# Patient Record
Sex: MALE | Race: BLACK OR AFRICAN AMERICAN | NOT HISPANIC OR LATINO | URBAN - METROPOLITAN AREA
[De-identification: names, ages, dates, MRNs, and addresses within clinical notes are randomized per-mention and may not be internally consistent; named-entity substitution may affect disease eponyms.]

---

## 2019-06-07 ENCOUNTER — EMERGENCY (EMERGENCY)
Facility: HOSPITAL | Age: 44
LOS: 1 days | Discharge: HOME | End: 2019-06-07
Attending: EMERGENCY MEDICINE | Admitting: EMERGENCY MEDICINE
Payer: SELF-PAY

## 2019-06-07 VITALS
SYSTOLIC BLOOD PRESSURE: 140 MMHG | DIASTOLIC BLOOD PRESSURE: 79 MMHG | RESPIRATION RATE: 19 BRPM | OXYGEN SATURATION: 96 % | HEART RATE: 78 BPM

## 2019-06-07 VITALS
HEART RATE: 66 BPM | DIASTOLIC BLOOD PRESSURE: 76 MMHG | RESPIRATION RATE: 36 BRPM | OXYGEN SATURATION: 100 % | SYSTOLIC BLOOD PRESSURE: 134 MMHG

## 2019-06-07 LAB
ALBUMIN SERPL ELPH-MCNC: 4 G/DL — SIGNIFICANT CHANGE UP (ref 3.3–5)
ALP SERPL-CCNC: 53 U/L — SIGNIFICANT CHANGE UP (ref 40–120)
ALT FLD-CCNC: 16 U/L — SIGNIFICANT CHANGE UP (ref 4–41)
ANION GAP SERPL CALC-SCNC: 14 MMO/L — SIGNIFICANT CHANGE UP (ref 7–14)
APTT BLD: 28.8 SEC — SIGNIFICANT CHANGE UP (ref 27.5–36.3)
AST SERPL-CCNC: 31 U/L — SIGNIFICANT CHANGE UP (ref 4–40)
BASOPHILS # BLD AUTO: 0.03 K/UL — SIGNIFICANT CHANGE UP (ref 0–0.2)
BASOPHILS NFR BLD AUTO: 0.2 % — SIGNIFICANT CHANGE UP (ref 0–2)
BILIRUB SERPL-MCNC: 0.6 MG/DL — SIGNIFICANT CHANGE UP (ref 0.2–1.2)
BLD GP AB SCN SERPL QL: NEGATIVE — SIGNIFICANT CHANGE UP
BUN SERPL-MCNC: 7 MG/DL — SIGNIFICANT CHANGE UP (ref 7–23)
CALCIUM SERPL-MCNC: 8.7 MG/DL — SIGNIFICANT CHANGE UP (ref 8.4–10.5)
CHLORIDE SERPL-SCNC: 102 MMOL/L — SIGNIFICANT CHANGE UP (ref 98–107)
CO2 SERPL-SCNC: 25 MMOL/L — SIGNIFICANT CHANGE UP (ref 22–31)
CREAT SERPL-MCNC: 1.07 MG/DL — SIGNIFICANT CHANGE UP (ref 0.5–1.3)
EOSINOPHIL # BLD AUTO: 0.17 K/UL — SIGNIFICANT CHANGE UP (ref 0–0.5)
EOSINOPHIL NFR BLD AUTO: 1 % — SIGNIFICANT CHANGE UP (ref 0–6)
GLUCOSE SERPL-MCNC: 154 MG/DL — HIGH (ref 70–99)
HCT VFR BLD CALC: 43.6 % — SIGNIFICANT CHANGE UP (ref 39–50)
HGB BLD-MCNC: 13.2 G/DL — SIGNIFICANT CHANGE UP (ref 13–17)
IMM GRANULOCYTES NFR BLD AUTO: 0.5 % — SIGNIFICANT CHANGE UP (ref 0–1.5)
INR BLD: 1.02 — SIGNIFICANT CHANGE UP (ref 0.88–1.17)
LYMPHOCYTES # BLD AUTO: 12.8 % — LOW (ref 13–44)
LYMPHOCYTES # BLD AUTO: 2.21 K/UL — SIGNIFICANT CHANGE UP (ref 1–3.3)
MCHC RBC-ENTMCNC: 29.1 PG — SIGNIFICANT CHANGE UP (ref 27–34)
MCHC RBC-ENTMCNC: 30.3 % — LOW (ref 32–36)
MCV RBC AUTO: 96.2 FL — SIGNIFICANT CHANGE UP (ref 80–100)
MONOCYTES # BLD AUTO: 0.98 K/UL — HIGH (ref 0–0.9)
MONOCYTES NFR BLD AUTO: 5.7 % — SIGNIFICANT CHANGE UP (ref 2–14)
NEUTROPHILS # BLD AUTO: 13.85 K/UL — HIGH (ref 1.8–7.4)
NEUTROPHILS NFR BLD AUTO: 79.8 % — HIGH (ref 43–77)
NRBC # FLD: 0 K/UL — SIGNIFICANT CHANGE UP (ref 0–0)
PLATELET # BLD AUTO: 357 K/UL — SIGNIFICANT CHANGE UP (ref 150–400)
PMV BLD: 11.1 FL — SIGNIFICANT CHANGE UP (ref 7–13)
POTASSIUM SERPL-MCNC: 3.7 MMOL/L — SIGNIFICANT CHANGE UP (ref 3.5–5.3)
POTASSIUM SERPL-SCNC: 3.7 MMOL/L — SIGNIFICANT CHANGE UP (ref 3.5–5.3)
PROT SERPL-MCNC: 6.8 G/DL — SIGNIFICANT CHANGE UP (ref 6–8.3)
PROTHROM AB SERPL-ACNC: 11.6 SEC — SIGNIFICANT CHANGE UP (ref 9.8–13.1)
RBC # BLD: 4.53 M/UL — SIGNIFICANT CHANGE UP (ref 4.2–5.8)
RBC # FLD: 12.7 % — SIGNIFICANT CHANGE UP (ref 10.3–14.5)
RH IG SCN BLD-IMP: POSITIVE — SIGNIFICANT CHANGE UP
SODIUM SERPL-SCNC: 141 MMOL/L — SIGNIFICANT CHANGE UP (ref 135–145)
WBC # BLD: 17.33 K/UL — HIGH (ref 3.8–10.5)
WBC # FLD AUTO: 17.33 K/UL — HIGH (ref 3.8–10.5)

## 2019-06-07 PROCEDURE — 31500 INSERT EMERGENCY AIRWAY: CPT

## 2019-06-07 PROCEDURE — 99291 CRITICAL CARE FIRST HOUR: CPT | Mod: 25

## 2019-06-07 PROCEDURE — 71045 X-RAY EXAM CHEST 1 VIEW: CPT | Mod: 26

## 2019-06-07 RX ORDER — SUCCINYLCHOLINE CHLORIDE 100 MG/5ML
150 SYRINGE (ML) INTRAVENOUS ONCE
Refills: 0 | Status: COMPLETED | OUTPATIENT
Start: 2019-06-07 | End: 2019-06-07

## 2019-06-07 RX ORDER — IBUPROFEN 200 MG
1 TABLET ORAL
Qty: 28 | Refills: 0
Start: 2019-06-07 | End: 2019-06-13

## 2019-06-07 RX ORDER — FENTANYL CITRATE 50 UG/ML
1 INJECTION INTRAVENOUS
Qty: 2500 | Refills: 0 | Status: DISCONTINUED | OUTPATIENT
Start: 2019-06-07 | End: 2019-06-07

## 2019-06-07 RX ORDER — MIDAZOLAM HYDROCHLORIDE 1 MG/ML
4 INJECTION, SOLUTION INTRAMUSCULAR; INTRAVENOUS ONCE
Refills: 0 | Status: DISCONTINUED | OUTPATIENT
Start: 2019-06-07 | End: 2019-06-07

## 2019-06-07 RX ORDER — MIDAZOLAM HYDROCHLORIDE 1 MG/ML
0.02 INJECTION, SOLUTION INTRAMUSCULAR; INTRAVENOUS
Qty: 100 | Refills: 0 | Status: DISCONTINUED | OUTPATIENT
Start: 2019-06-07 | End: 2019-06-07

## 2019-06-07 RX ORDER — ONDANSETRON 8 MG/1
1 TABLET, FILM COATED ORAL
Qty: 15 | Refills: 0
Start: 2019-06-07 | End: 2019-06-11

## 2019-06-07 RX ORDER — PROPOFOL 10 MG/ML
60 INJECTION, EMULSION INTRAVENOUS
Qty: 1000 | Refills: 0 | Status: DISCONTINUED | OUTPATIENT
Start: 2019-06-07 | End: 2019-06-07

## 2019-06-07 RX ORDER — ETOMIDATE 2 MG/ML
20 INJECTION INTRAVENOUS ONCE
Refills: 0 | Status: COMPLETED | OUTPATIENT
Start: 2019-06-07 | End: 2019-06-07

## 2019-06-07 RX ADMIN — FENTANYL CITRATE 9 MICROGRAM(S)/KG/HR: 50 INJECTION INTRAVENOUS at 15:09

## 2019-06-07 RX ADMIN — Medication 150 MILLIGRAM(S): at 14:41

## 2019-06-07 RX ADMIN — MIDAZOLAM HYDROCHLORIDE 4 MILLIGRAM(S): 1 INJECTION, SOLUTION INTRAMUSCULAR; INTRAVENOUS at 15:06

## 2019-06-07 RX ADMIN — PROPOFOL 32.4 MICROGRAM(S)/KG/MIN: 10 INJECTION, EMULSION INTRAVENOUS at 15:20

## 2019-06-07 RX ADMIN — ETOMIDATE 20 MILLIGRAM(S): 2 INJECTION INTRAVENOUS at 14:40

## 2019-06-07 NOTE — ED ADULT NURSE REASSESSMENT NOTE - NS ED NURSE REASSESS COMMENT FT1
report received. pt with snoring resp. awakens to verbal stimuli,falls back to sleep,opens eyes intermittently.

## 2019-06-07 NOTE — ED PROVIDER NOTE - NSFOLLOWUPINSTRUCTIONS_ED_ALL_ED_FT
1) Follow up with your doctor in 1 week. Call for an appointment.     2) Return to the ER immediately for new or worsening symptoms including fevers, vomiting or recurrent bleeding.     3) Take percocet once every 6-8 hours as needed for severe pain.     4) Take zofran under you tongue for nausea as needed up to three times a day.     5) Take the antibiotic once a day for 1 week.     6) Take ibuprofen 800mg every 6-8 hours as needed for mild to moderate pain.

## 2019-06-07 NOTE — CONSULT NOTE ADULT - ATTENDING COMMENTS
I was scrubbed for the procedure and participated directly in all surgical management of this patient.

## 2019-06-07 NOTE — ED ADULT NURSE NOTE - NSIMPLEMENTINTERV_GEN_ALL_ED
Implemented All Fall Risk Interventions:  [place] to call system. Call bell, personal items and telephone within reach. Instruct patient to call for assistance. Room bathroom lighting operational. Non-slip footwear when patient is off stretcher. Physically safe environment: no spills, clutter or unnecessary equipment. Stretcher in lowest position, wheels locked, appropriate side rails in place. Provide visual cue, wrist band, yellow gown, etc. Monitor gait and stability. Monitor for mental status changes and reorient to person, place, and time. Review medications for side effects contributing to fall risk. Reinforce activity limits and safety measures with patient and family.

## 2019-06-07 NOTE — ED ADULT NURSE NOTE - OBJECTIVE STATEMENT
received to TrB as notification from doctors office having procedure done, pt arrives with LMS placed my dental surgeons that accompanied pt, pt sedated prior to arrival, with active oral bleed, pt intubated on arrival with 7.5 tube @ 23 line, propofol and fentanyl drips initiated as ordered. received to TrB as notification active oral bleeding, pt sedated prior to arrival and arrives with LMA placed by dental surgeons that accompanied pt from office, pt intubated on arrival with 7.5 tube @ 23 line, propofol and fentanyl drips initiated as ordered, OMFS team remains at the bedside, family updated with POC by surgeon. received to TrB as notification active oral bleeding, pt sedated prior to arrival and arrives with LMA placed by dental surgeons that accompanied pt from office, pt intubated on arrival with 7.5 tube @ 23 line, 2 IV access lft arm obtained, one 18g, on 20g, labs obtained, propofol and fentanyl drips initiated as ordered, pt has remained VSS, OMFS team remains at the bedside, family updated with POC by surgeon.

## 2019-06-07 NOTE — ED ADULT TRIAGE NOTE - CHIEF COMPLAINT QUOTE
Notification direct to TrB.  Pt having dental procedure and pt bleeding.  Unable to stop bleeding.  Arrived with LMA in place.  Arrives with PIV in place.  Propofol given by dentist as per EMS.

## 2019-06-07 NOTE — ED PROVIDER NOTE - OBJECTIVE STATEMENT
43M no sig PMH p/w significant oral bleeding s/p OMFS procedure. Pt was undergoing procedure complicated by arterial bleed. LMA was placed and pt transferred to [facility] with OMFS surgeons on ambulance. Pt received propofol enroute to [facility]. Upon arrival pt with 4 igel LMA, bleeding noted from mouth sat 100%

## 2019-06-07 NOTE — ED ADULT NURSE REASSESSMENT NOTE - NS ED NURSE REASSESS COMMENT FT1
pt sleeping,snoring noted,re eval by oral surgery. bld noted on gauze in mouth,md informed. pt responds by moaning to verbal,tactile stimuli. family at bedside

## 2019-06-07 NOTE — ED ADULT NURSE REASSESSMENT NOTE - NS ED NURSE REASSESS COMMENT FT1
Pt more awake, wants to attempt to ambulate. Pt ambulated around room with RN and MD Monson at bedside. Pt vomited x2. Pt to remain for more monitoring/observation. Will continue to monitor.

## 2019-06-07 NOTE — ED ADULT NURSE REASSESSMENT NOTE - NS ED NURSE REASSESS COMMENT FT1
Pt awake, alert, sitting up in bed, respirations even and unlabored b/l. Pt no longer vomiting at this time, reports only spitting. Water given by MD Monson to PO trial. Family at bedside. Will continue to monitor.

## 2019-06-07 NOTE — ED ADULT NURSE REASSESSMENT NOTE - NS ED NURSE REASSESS COMMENT FT1
pt extubated, all drips discontinued 1800, pt with snoring respirations at this time, NRB switched to 4L NC, O2 sat 100%, VSS, OMFS at the bedside placed knotted gauze at suture site, will continue to monitor

## 2019-06-07 NOTE — ED PROVIDER NOTE - CLINICAL SUMMARY MEDICAL DECISION MAKING FREE TEXT BOX
43M p/w oral bleeding after OMFS procedure; intubated for airway protection; OMFS at bedside to assess/acheieve hemostasis

## 2019-06-07 NOTE — CONSULT NOTE ADULT - SUBJECTIVE AND OBJECTIVE BOX
43M no sig PMH p/w significant intraoral bleeding s/p OMFS procedure in private office. Pt was undergoing procedure complicated by arterial bleed. LMA was placed and pt transferred to [facility] with OMFS surgeons on ambulance. Pt received propofol enroute to [facility]. Upon arrival pt with 4 igel LMA, bleeding noted from mouth sat 100%. Patient sedated and intubated. OMFS consulted. Airway protected prior to arrival of OMFS team.     PMH: healthy  PSH: denies  Meds: denies  SH: Tobacco (+)   All: NKDA    Vital Signs Last 24 Hrs  HR: 74 (07 Jun 2019 18:27) (59 - 74)  BP: 145/68 (07 Jun 2019 18:27) (129/76 - 153/84)  RR: 19 (07 Jun 2019 18:27) (15 - 36)  SpO2: 100% (07 Jun 2019 18:27) (99% - 100%)    Gen: NAD, AAOx3  CV: RRR, no murmurs  Pulm: Intubated   H: NCAT, normocephalic, no edema  E: Exam limited   E: TMs clear b/l, no audio changes  N: nares clear b/l, no septal hematoma  T: 3mm laceration posterior soft palate w/ slight oozing, trachea midline  EOE: no LAD, no extraoral edema, no signs of lacerations  TMJ: FROM, no clicking/popping b/l  SÁNCHEZ: 42-44mm  IOE: full thickness palatal flap open with dental implants present within bone, mild bleeding identified from posterior left quadrant and soft palate, laceration of soft palate was noted to be 2mm                          13.2   17.33 )-----------( 357      ( 07 Jun 2019 15:00 )             43.6     06-07    141  |  102  |  7   ----------------------------<  154<H>  3.7   |  25  |  1.07    Ca    8.7      07 Jun 2019 15:00    TPro  6.8  /  Alb  4.0  /  TBili  0.6  /  DBili  x   /  AST  31  /  ALT  16  /  AlkPhos  53  06-07    I&O's Summary        Radiographic findings: (CT Max/Face without contrast)    A/P: 43y Male s/p .  - 43M no sig PMH p/w significant intraoral bleeding s/p OMFS procedure in private office. Pt was undergoing procedure complicated by arterial bleed. LMA was placed and pt transferred to [facility] with OMFS surgeons on ambulance. Pt received propofol enroute to [facility]. Upon arrival pt with 4 igel LMA, bleeding noted from mouth sat 100%. Patient sedated and intubated. OMFS consulted. Airway protected prior to arrival of OMFS team.     PMH: healthy  PSH: denies  Meds: denies  SH: Tobacco (+)   All: NKDA    Vital Signs Last 24 Hrs  HR: 74 (07 Jun 2019 18:27) (59 - 74)  BP: 145/68 (07 Jun 2019 18:27) (129/76 - 153/84)  RR: 19 (07 Jun 2019 18:27) (15 - 36)  SpO2: 100% (07 Jun 2019 18:27) (99% - 100%)    Gen: NAD, AAOx3  CV: RRR, no murmurs  Pulm: Intubated   H: NCAT, normocephalic, no edema  E: Exam limited   E: TMs clear b/l, no audio changes  N: nares clear b/l, no septal hematoma  T: 3mm laceration posterior soft palate w/ slight oozing, trachea midline  EOE: no LAD, no extraoral edema, no signs of lacerations  TMJ: FROM, no clicking/popping b/l  SÁNCHEZ: 42-44mm  IOE: full thickness palatal flap open with dental implants present within bone, mild bleeding identified from posterior left quadrant and soft palate, laceration of soft palate was noted to be 2mm                          13.2   17.33 )-----------( 357      ( 07 Jun 2019 15:00 )             43.6     06-07    141  |  102  |  7   ----------------------------<  154<H>  3.7   |  25  |  1.07    Ca    8.7      07 Jun 2019 15:00    TPro  6.8  /  Alb  4.0  /  TBili  0.6  /  DBili  x   /  AST  31  /  ALT  16  /  AlkPhos  53  06-07    Procedure: Area was explored vigilantly to determine source of bleeding. Bleeding/oozing identified as emanating from posterior soft palate and posterior left maxillary quadrant. 3-0 chromic sutures and 4-0 vicryl sutures used to   A/P: 43y Male s/p primary closure and control of arterial bleed likely from greater palatine artery. Full thickness flap sutured securely. Hemostasis achieved. Patient extubated   - 43M no sig PMH p/w significant intraoral bleeding s/p OMFS procedure in private office. Pt was undergoing procedure complicated by arterial bleed. LMA was placed and pt transferred to [facility] with OMFS surgeons on ambulance. Pt received propofol enroute to [facility]. Upon arrival pt with 4 igel LMA, bleeding noted from mouth sat 100%. Patient sedated and intubated. OMFS consulted. Airway protected prior to arrival of OMFS team.     PMH: healthy  PSH: denies  Meds: denies  SH: Tobacco (+)   All: NKDA    Vital Signs Last 24 Hrs  HR: 74 (07 Jun 2019 18:27) (59 - 74)  BP: 145/68 (07 Jun 2019 18:27) (129/76 - 153/84)  RR: 19 (07 Jun 2019 18:27) (15 - 36)  SpO2: 100% (07 Jun 2019 18:27) (99% - 100%)    Gen: NAD, AAOx3  CV: RRR, no murmurs  Pulm: Intubated   H: NCAT, normocephalic, no edema  E: Exam limited   E: TMs clear b/l, no audio changes  N: nares clear b/l, no septal hematoma  T: 3mm laceration posterior soft palate w/ slight oozing, trachea midline  EOE: no LAD, no extraoral edema, no signs of lacerations  TMJ: FROM, no clicking/popping b/l  SÁNCHEZ: 42-44mm  IOE: full thickness palatal flap open with dental implants present within bone, mild bleeding identified from posterior left quadrant and soft palate, laceration of soft palate was noted to be 2mm                          13.2   17.33 )-----------( 357      ( 07 Jun 2019 15:00 )             43.6     06-07    141  |  102  |  7   ----------------------------<  154<H>  3.7   |  25  |  1.07    Ca    8.7      07 Jun 2019 15:00    TPro  6.8  /  Alb  4.0  /  TBili  0.6  /  DBili  x   /  AST  31  /  ALT  16  /  AlkPhos  53  06-07    Procedure: Intraoral area was explored vigilantly to determine source of bleeding. Suction, gauze, and Miguel retractor used to clear debris, blood, and visualize bleeding source. Bleeding/oozing identified as emanating from posterior soft palate and posterior left maxillary quadrant. Oozing area posterior to left maxillary tuberosity identified and tissue securely sutured w/ 4-0 vicryl sutures. Attention was then diverted to posterior soft palate where 2mm lesion was confirmed from earlier. 3-0 chromic sutures used to primarily close lesion until oozing controlled. 3-0 chromic sutures and 4-0 vicryl sutures used to primarily secure loose full thickness palatal flap tissue which was released earlier during procedure in private office. Hemostasis achieved and gauze pads used to apply pressure bilaterally.     A/P: 43y Male s/p primary closure and control of arterial bleed likely from greater palatine artery and lesion of posterior soft palate. Full thickness palatal flap sutured securely along with all bleeding sites. Hemostasis achieved and confirmed prior to extubation. Patient extubated safely by trauma team.   - Patient stable and hemostatic from OMFS standpoint   - Recommend discharge upon extubation  - Recommend soft non-chew diet, pain control, Peridex, abx   - Followup next week with OMFS. Please call [phone] to make appointment with Dr. Kirkland.   - Patient to also followup with private OMFS 43M no sig PMH p/w significant intraoral bleeding s/p OMFS procedure in private office. Pt was undergoing procedure complicated by brisk uncontrollable oral bleed. LMA was placed and pt transferred to [facility] with OMFS surgeons on ambulance. Pt received propofol enroute to [facility]. Upon arrival patient sedated and intubated. OMFS consulted for aid with magement. Upon arrival, patient was already intubated and sedated in ED. Consent for exam and necessary intervention(s) obtained from wife.    PMH: healthy  PSH: denies  Meds: denies  SH: Tobacco (+)   All: NKDA    Vital Signs Last 24 Hrs  HR: 74 (07 Jun 2019 18:27) (59 - 74)  BP: 145/68 (07 Jun 2019 18:27) (129/76 - 153/84)  RR: 19 (07 Jun 2019 18:27) (15 - 36)  SpO2: 100% (07 Jun 2019 18:27) (99% - 100%)    Gen: NAD, AAOx3  CV: RRR, no murmurs  Pulm: Intubated   H: NC/AT, normocephalic, no edema  Ears: Normal external aatomy  Oral Exam limited   N: nares clear b/l, no septal hematoma  EOE: no LAD, no extraoral edema, no signs of lacerations  TMJ: FROM, no clicking/popping b/l  SÁNCHEZ: 42-44mm  IOE:   Packed with gauze.   Oral packs removed.  A full thickness palatal flap open with dental implants that were placed previously in outside office were noted present within bone  Moderate steady ooze identified from posterior left quadrant  from posterior flap.  A soft palate bleed noted to arise from mucosal laceration of soft palate, laceration of soft palate was noted to be 2.5cm                          13.2   17.33 )-----------( 357      ( 07 Jun 2019 15:00 )             43.6     06-07    141  |  102  |  7   ----------------------------<  154<H>  3.7   |  25  |  1.07    Ca    8.7      07 Jun 2019 15:00    TPro  6.8  /  Alb  4.0  /  TBili  0.6  /  DBili  x   /  AST  31  /  ALT  16  /  AlkPhos  53  06-07    Procedure: Intraoral area was explored. Lidocaine 1% with epinephrine 1:100k was infiltrated to aid with hemostsis.   Suction, gauze, and Miguel retractor used to visualize bleeding sources.   Bleeding/oozing from posterior soft palate and posterior left maxillary quadrant managed w/ primary closure of palatal flap with 3-0 vicryl sutures, water tight closure and gauze pressure x 10 minutes. Hemostasis was achieved. Maxillary flap measured +7cm in total.     Attention was then diverted to posterior soft palate where 2.5cm laceration was appreciated. Laceration was explored and noted to extend through oral side mucosa and into muscle. 3-0 chromic sutures then used to primarily close palatal laceration, watertight. Hemostasis achieved and gauze pads used to apply pressure bilaterally. Patient's wife was notified of findings and intervenetions necessary to achieve hemostasis. All questions answered. Tolerated well.    A/P: 43y Male s/p exploration for uncontrollable oral bleeding. Primary closure and control of bleed likely from greater palatine artery and lesion of posterior soft palate completed.   - Patient stable and hemostatic from OMFS standpoint   - Recommend discharge upon extubation  - Recommend soft non-chew diet, pain control, Peridex, abx   - Followup next week with OMFS. Please call [phone] to make appointment with Dr. Kirkland.   - Patient to also followup with private OMFS for implants

## 2019-06-07 NOTE — ED PROVIDER NOTE - ATTENDING CONTRIBUTION TO CARE
The patient seen and examined.  The patient presents with oral bleeding after dental implant    Oral bleeding    I, Aron Rangel, performed the initial face to face bedside interview with this patient regarding history of present illness, review of symptoms and relevant past medical, social and family history.  I completed an independent physical examination.  I was the initial provider who evaluated this patient. I have signed out the follow up of any pending tests (i.e. labs, radiological studies) to the resident.  I have communicated the patient’s plan of care and disposition with the resident. The patient seen and examined.  The patient presents with oral bleeding after dental implant  The patient required endotracheal intubation for airway control    Oral bleeding    I, Aron Rangel, performed the initial face to face bedside interview with this patient regarding history of present illness, review of symptoms and relevant past medical, social and family history.  I completed an independent physical examination.  I was the initial provider who evaluated this patient. I have signed out the follow up of any pending tests (i.e. labs, radiological studies) to the resident.  I have communicated the patient’s plan of care and disposition with the resident.

## 2019-06-07 NOTE — ED PROVIDER NOTE - PROGRESS NOTE DETAILS
Case s/o to Dr Monson progress: OMFS felt bleeding controlled and requested extubation and observation and discharge. Propofol and fentanyl stopped and pt started to wake up and was extubated without problems. maintaining airway well. minor amount of blood in mouth, patient still very lethargic. Seen by OMFS who had him bite on gauze and feels he can go home once awake enough. VSS Will continue to observe in ED patient sitting up in bed. drinking sips of water. vitals stable. no more bleeding. will dc home. patient sitting up in bed. drinking sips of water. vitals stable. no vomiting. able to ambulate no more bleeding. patient's OMFS surgeon at bedside. will dc home on zofran, percocet levaquin for possible lung infiltrates. Follow up PCP and OMFS. RTER at once if worse

## 2019-06-07 NOTE — ED ADULT NURSE REASSESSMENT NOTE - NS ED NURSE REASSESS COMMENT FT1
Report received from day RN Tomasa, pt responsive to tactile stimuli, pt moaning. Resps even/equal b/l. Pt o2sat 100% on 4L of O2 via NC. Remains in room for obs by ED team and OMFS. Family at bedside. Will continue to monitor.

## 2025-06-23 NOTE — ED ADULT NURSE NOTE - NSFALLRSKINDICTYPE_ED_ALL_ED
Patients wife called back to confirm that she received a message from the pharmacy that they do have the medicine.   Sedation